# Patient Record
(demographics unavailable — no encounter records)

---

## 2025-04-23 NOTE — PLAN
[FreeTextEntry1] : 50 yo for annual exam.  HCM: - Repeat pelvic US today - Pap smear today, will consider rpt pelvic mri based on findings - Rx mammo/breast sono - Colonoscopy advised - Referral for GI given - Reviewed Pelvic MRI from 4/4/2024 in office w/ pt today; right sided cyst noted vs pedunculated/broad ligament fibroid - Advised pt to f/u with PCP for annual health visit and appropriate immunizations - Referral for PCP given  RTO for annual exam/PRN.
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
## 2025-04-23 NOTE — END OF VISIT
[FreeTextEntry3] : I, Margot Ragsdale, acted as a scribe on behalf of Dr. Bri Ruff M.D. on 04/23/2025.  All medical entries made by the scribe were at my, Dr. Bri Ruff M.D., direction and personally dictated by me on 04/23/2025. I have reviewed the chart and agree that the record accurately reflects my personal performance of the history, physical exam, assessment and plan. I have also personally directed, reviewed, and agreed with the chart.

## 2025-04-23 NOTE — PLAN
[FreeTextEntry1] : 52 yo for annual exam.  HCM: - Repeat pelvic US today - Pap smear today, will consider rpt pelvic mri based on findings - Rx mammo/breast sono - Colonoscopy advised - Referral for GI given - Reviewed Pelvic MRI from 4/4/2024 in office w/ pt today; right sided cyst noted vs pedunculated/broad ligament fibroid - Advised pt to f/u with PCP for annual health visit and appropriate immunizations - Referral for PCP given  RTO for annual exam/PRN.
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
